# Patient Record
Sex: FEMALE | Race: OTHER | Employment: FULL TIME | URBAN - METROPOLITAN AREA
[De-identification: names, ages, dates, MRNs, and addresses within clinical notes are randomized per-mention and may not be internally consistent; named-entity substitution may affect disease eponyms.]

---

## 2019-07-10 ENCOUNTER — HOSPITAL ENCOUNTER (EMERGENCY)
Age: 29
Discharge: HOME | End: 2019-07-10
Payer: COMMERCIAL

## 2019-07-10 VITALS
DIASTOLIC BLOOD PRESSURE: 76 MMHG | RESPIRATION RATE: 16 BRPM | OXYGEN SATURATION: 97 % | BODY MASS INDEX: 24.55 KG/M2 | WEIGHT: 130 LBS | HEART RATE: 79 BPM | TEMPERATURE: 97.9 F | HEIGHT: 61 IN | SYSTOLIC BLOOD PRESSURE: 108 MMHG

## 2019-07-10 DIAGNOSIS — N39.0 URINARY TRACT INFECTION WITH HEMATURIA, SITE UNSPECIFIED: Primary | ICD-10-CM

## 2019-07-10 DIAGNOSIS — R31.9 URINARY TRACT INFECTION WITH HEMATURIA, SITE UNSPECIFIED: Primary | ICD-10-CM

## 2019-07-10 LAB
BACTERIA: ABNORMAL /HPF
BILIRUBIN URINE: NEGATIVE
BLOOD, URINE: ABNORMAL
CLARITY: ABNORMAL
COLOR: YELLOW
EPITHELIAL CELLS, UA: ABNORMAL /HPF
GLUCOSE URINE: NEGATIVE MG/DL
HCG(URINE) PREGNANCY TEST: NEGATIVE
KETONES, URINE: NEGATIVE MG/DL
LEUKOCYTE ESTERASE, URINE: ABNORMAL
NITRITE, URINE: POSITIVE
PH UA: 7.5 (ref 5–9)
PROTEIN UA: ABNORMAL MG/DL
RBC UA: ABNORMAL /HPF (ref 0–2)
SPECIFIC GRAVITY UA: 1.01 (ref 1–1.03)
UROBILINOGEN, URINE: 0.2 E.U./DL
WBC UA: ABNORMAL /HPF (ref 0–5)

## 2019-07-10 PROCEDURE — 81025 URINE PREGNANCY TEST: CPT

## 2019-07-10 PROCEDURE — 99283 EMERGENCY DEPT VISIT LOW MDM: CPT

## 2019-07-10 PROCEDURE — 81001 URINALYSIS AUTO W/SCOPE: CPT

## 2019-07-10 PROCEDURE — 87186 SC STD MICRODIL/AGAR DIL: CPT

## 2019-07-10 PROCEDURE — 87088 URINE BACTERIA CULTURE: CPT

## 2019-07-10 RX ORDER — CEFDINIR 300 MG/1
300 CAPSULE ORAL 2 TIMES DAILY
Qty: 14 CAPSULE | Refills: 0 | Status: SHIPPED | OUTPATIENT
Start: 2019-07-10 | End: 2019-07-17

## 2019-07-10 RX ORDER — PHENAZOPYRIDINE HYDROCHLORIDE 200 MG/1
200 TABLET, FILM COATED ORAL 3 TIMES DAILY PRN
Qty: 6 TABLET | Refills: 0 | Status: SHIPPED | OUTPATIENT
Start: 2019-07-10 | End: 2019-07-12

## 2019-07-10 ASSESSMENT — PAIN DESCRIPTION - ONSET: ONSET: SUDDEN

## 2019-07-10 ASSESSMENT — PAIN SCALES - GENERAL: PAINLEVEL_OUTOF10: 7

## 2019-07-10 ASSESSMENT — PAIN DESCRIPTION - ORIENTATION: ORIENTATION: RIGHT;LOWER

## 2019-07-10 ASSESSMENT — PAIN DESCRIPTION - DESCRIPTORS: DESCRIPTORS: DISCOMFORT

## 2019-07-10 ASSESSMENT — PAIN DESCRIPTION - FREQUENCY: FREQUENCY: CONTINUOUS

## 2019-07-10 ASSESSMENT — PAIN DESCRIPTION - LOCATION: LOCATION: BACK

## 2019-07-10 ASSESSMENT — PAIN DESCRIPTION - PROGRESSION: CLINICAL_PROGRESSION: NOT CHANGED

## 2019-07-10 ASSESSMENT — PAIN DESCRIPTION - PAIN TYPE: TYPE: ACUTE PAIN

## 2019-07-10 NOTE — ED PROVIDER NOTES
[facility]     Department of Emergency Medicine   ED  Provider Note  Admit Date/RoomTime: 7/10/2019  6:28 PM  ED Room: 05/05  Chief Complaint:   Urinary Tract Infection (+odor. Pain in right lower back. +frequency. Gets frequent uti's. )    History of Present Illness   Source of history provided by:  patient. History/Exam Limitations: none. Rusty Williamson is a 34 y.o. old female who has a past medical history of:   Past Medical History:   Diagnosis Date    Urinary tract infection     presents to the emergency department by private vehicle, for dysuria, which occured several day(s) prior to arrival.  Since onset the symptoms have been intermittent and mild in severity. Symptoms are associated with frequency. She has a history of recurrent UTI. Patient denies any vaginal discharge or possible std exposure    ROS   Pertinent positives and negatives are stated within HPI, all other systems reviewed and are negative. History reviewed. No pertinent surgical history. Social History:  reports that she has never smoked. She does not have any smokeless tobacco history on file. Family History: family history is not on file. Allergies: Sulfa antibiotics    Physical Exam            ED Triage Vitals   BP Temp Temp Source Pulse Resp SpO2 Height Weight   07/10/19 1825 07/10/19 1825 07/10/19 1825 07/10/19 1825 07/10/19 1825 07/10/19 1825 07/10/19 1833 07/10/19 1833   108/76 97.9 °F (36.6 °C) Oral 79 16 97 % 5' 1\" (1.549 m) 130 lb (59 kg)      Oxygen Saturation Interpretation: Normal.    · Constitutional:  Alert, development consistent with age. · HEENT:  NC/NT. Airway patent. · Neck:  Normal ROM. Supple. · Respiratory:  Lungs Clear to auscultation and breath sounds equal.  · CV:  Regular rate and rhythm, normal heart sounds, without pathological murmurs, ectopy, gallops, or rubs. · GI:  General Appearance: normal.         Bowel sounds: normal bowel sounds. Distension:  None. spoken with the patient and discussed todays results, in addition to providing specific details for the plan of care and counseling regarding the diagnosis and prognosis. Questions are answered at this time and they are agreeable with the plan. Assessment      1. Urinary tract infection with hematuria, site unspecified      Plan   Disposition: Discharge to home  Patient condition is good    New Medications     Discharge Medication List as of 7/10/2019  7:20 PM      START taking these medications    Details   phenazopyridine (PYRIDIUM) 200 MG tablet Take 1 tablet by mouth 3 times daily as needed for Pain, Disp-6 tablet, R-0Print      cefdinir (OMNICEF) 300 MG capsule Take 1 capsule by mouth 2 times daily for 7 days, Disp-14 capsule, R-0Print           Electronically signed by Dorthea Romberg, APRN - CNP   DD: 7/10/19  **This report was transcribed using voice recognition software. Every effort was made to ensure accuracy; however, inadvertent computerized transcription errors may be present.   END OF ED PROVIDER NOTE          Dorthea Romberg, APRN - CNP  07/10/19 1286  ATTENDING PROVIDER ATTESTATION:     Supervising Physician, on-site, available for consultation, non-participatory in the evaluation or care of this patient       Kehinde Austin DO  07/11/19 4985

## 2019-07-13 LAB
ORGANISM: ABNORMAL
URINE CULTURE, ROUTINE: ABNORMAL
URINE CULTURE, ROUTINE: ABNORMAL

## 2019-08-07 ENCOUNTER — HOSPITAL ENCOUNTER (EMERGENCY)
Age: 29
Discharge: HOME | End: 2019-08-07
Attending: EMERGENCY MEDICINE
Payer: COMMERCIAL

## 2019-08-07 VITALS
HEART RATE: 80 BPM | SYSTOLIC BLOOD PRESSURE: 120 MMHG | RESPIRATION RATE: 16 BRPM | HEIGHT: 61 IN | BODY MASS INDEX: 24.55 KG/M2 | OXYGEN SATURATION: 98 % | TEMPERATURE: 97.6 F | WEIGHT: 130 LBS | DIASTOLIC BLOOD PRESSURE: 70 MMHG

## 2019-08-07 DIAGNOSIS — N39.0 URINARY TRACT INFECTION WITHOUT HEMATURIA, SITE UNSPECIFIED: Primary | ICD-10-CM

## 2019-08-07 LAB
BACTERIA: ABNORMAL /HPF
BILIRUBIN URINE: NEGATIVE
BLOOD, URINE: ABNORMAL
CLARITY: ABNORMAL
COLOR: YELLOW
EPITHELIAL CELLS, UA: ABNORMAL /HPF
GLUCOSE URINE: NEGATIVE MG/DL
HCG(URINE) PREGNANCY TEST: NEGATIVE
KETONES, URINE: NEGATIVE MG/DL
LEUKOCYTE ESTERASE, URINE: ABNORMAL
NITRITE, URINE: NEGATIVE
PH UA: 6 (ref 5–9)
PROTEIN UA: 100 MG/DL
RBC UA: ABNORMAL /HPF (ref 0–2)
SPECIFIC GRAVITY UA: 1.02 (ref 1–1.03)
UROBILINOGEN, URINE: 0.2 E.U./DL
WBC UA: >20 /HPF (ref 0–5)

## 2019-08-07 PROCEDURE — 81001 URINALYSIS AUTO W/SCOPE: CPT

## 2019-08-07 PROCEDURE — 87088 URINE BACTERIA CULTURE: CPT

## 2019-08-07 PROCEDURE — 99283 EMERGENCY DEPT VISIT LOW MDM: CPT

## 2019-08-07 PROCEDURE — 87186 SC STD MICRODIL/AGAR DIL: CPT

## 2019-08-07 PROCEDURE — 81025 URINE PREGNANCY TEST: CPT

## 2019-08-07 PROCEDURE — 6370000000 HC RX 637 (ALT 250 FOR IP): Performed by: EMERGENCY MEDICINE

## 2019-08-07 RX ORDER — IBUPROFEN 800 MG/1
800 TABLET ORAL ONCE
Status: COMPLETED | OUTPATIENT
Start: 2019-08-07 | End: 2019-08-07

## 2019-08-07 RX ORDER — CEFDINIR 300 MG/1
300 CAPSULE ORAL ONCE
Status: COMPLETED | OUTPATIENT
Start: 2019-08-07 | End: 2019-08-07

## 2019-08-07 RX ORDER — CEFDINIR 300 MG/1
300 CAPSULE ORAL 2 TIMES DAILY
Qty: 20 CAPSULE | Refills: 0 | Status: SHIPPED | OUTPATIENT
Start: 2019-08-07 | End: 2019-08-17

## 2019-08-07 RX ORDER — TRAMADOL HYDROCHLORIDE 50 MG/1
50 TABLET ORAL EVERY 6 HOURS PRN
Qty: 5 TABLET | Refills: 0 | Status: SHIPPED | OUTPATIENT
Start: 2019-08-07 | End: 2019-08-09

## 2019-08-07 RX ADMIN — IBUPROFEN 800 MG: 800 TABLET, FILM COATED ORAL at 06:25

## 2019-08-07 RX ADMIN — CEFDINIR 300 MG: 300 CAPSULE ORAL at 06:25

## 2019-08-07 ASSESSMENT — PAIN SCALES - GENERAL
PAINLEVEL_OUTOF10: 6
PAINLEVEL_OUTOF10: 6

## 2019-08-07 ASSESSMENT — PAIN DESCRIPTION - DESCRIPTORS: DESCRIPTORS: DISCOMFORT

## 2019-08-07 ASSESSMENT — PAIN DESCRIPTION - LOCATION: LOCATION: PELVIS

## 2019-08-07 ASSESSMENT — PAIN DESCRIPTION - PAIN TYPE: TYPE: ACUTE PAIN

## 2019-08-07 NOTE — ED PROVIDER NOTES
HPI:  8/7/19,   Time: 6:25 AM         Nikki Reyez is a 34 y.o. female presenting to the ED for dysuria, beginning more than 1 hour ago. The complaint has been persistent, moderate in severity, and worsened by nothing. States had recent UTI approximately 2 weeks ago finished her antibiotics complaining of urinary burning today no fever chills no vomiting    ROS:   Pertinent positives and negatives are stated within HPI, all other systems reviewed and are negative.  --------------------------------------------- PAST HISTORY ---------------------------------------------  Past Medical History:  has a past medical history of Urinary tract infection. Past Surgical History:  has no past surgical history on file. Social History:  reports that she has never smoked. She has never used smokeless tobacco. She reports that she drinks alcohol. She reports that she does not use drugs. Family History: family history is not on file. The patients home medications have been reviewed.     Allergies: Sulfa antibiotics    -------------------------------------------------- RESULTS -------------------------------------------------  All laboratory and radiology results have been personally reviewed by myself   LABS:  Results for orders placed or performed during the hospital encounter of 08/07/19   Urinalysis with Microscopic   Result Value Ref Range    Color, UA Yellow Straw/Yellow    Clarity, UA CLOUDY (A) Clear    Glucose, Ur Negative Negative mg/dL    Bilirubin Urine Negative Negative    Ketones, Urine Negative Negative mg/dL    Specific Gravity, UA 1.025 1.005 - 1.030    Blood, Urine MODERATE (A) Negative    pH, UA 6.0 5.0 - 9.0    Protein,  (A) Negative mg/dL    Urobilinogen, Urine 0.2 <2.0 E.U./dL    Nitrite, Urine Negative Negative    Leukocyte Esterase, Urine MODERATE (A) Negative    WBC, UA >20 0 - 5 /HPF    RBC, UA 5-10 (A) 0 - 2 /HPF    Epi Cells FEW /HPF    Bacteria, UA MANY (A) /HPF   Pregnancy,

## 2019-08-09 LAB
ORGANISM: ABNORMAL
URINE CULTURE, ROUTINE: ABNORMAL
URINE CULTURE, ROUTINE: ABNORMAL

## 2019-11-21 ENCOUNTER — HOSPITAL ENCOUNTER (EMERGENCY)
Age: 29
Discharge: HOME | End: 2019-11-21
Attending: EMERGENCY MEDICINE
Payer: COMMERCIAL

## 2019-11-21 VITALS
HEIGHT: 61 IN | WEIGHT: 138 LBS | OXYGEN SATURATION: 98 % | TEMPERATURE: 97.8 F | BODY MASS INDEX: 26.06 KG/M2 | RESPIRATION RATE: 16 BRPM | HEART RATE: 74 BPM | SYSTOLIC BLOOD PRESSURE: 126 MMHG | DIASTOLIC BLOOD PRESSURE: 82 MMHG

## 2019-11-21 DIAGNOSIS — N30.01 ACUTE CYSTITIS WITH HEMATURIA: Primary | ICD-10-CM

## 2019-11-21 LAB
BACTERIA: ABNORMAL /HPF
BILIRUBIN URINE: NEGATIVE
BLOOD, URINE: ABNORMAL
CLARITY: ABNORMAL
COLOR: YELLOW
GLUCOSE URINE: NEGATIVE MG/DL
HCG(URINE) PREGNANCY TEST: NEGATIVE
KETONES, URINE: NEGATIVE MG/DL
LEUKOCYTE ESTERASE, URINE: ABNORMAL
NITRITE, URINE: NEGATIVE
PH UA: 5.5 (ref 5–9)
PROTEIN UA: 30 MG/DL
RBC UA: ABNORMAL /HPF (ref 0–2)
SPECIFIC GRAVITY UA: 1.02 (ref 1–1.03)
UROBILINOGEN, URINE: 0.2 E.U./DL
WBC UA: ABNORMAL /HPF (ref 0–5)

## 2019-11-21 PROCEDURE — 87088 URINE BACTERIA CULTURE: CPT

## 2019-11-21 PROCEDURE — 6370000000 HC RX 637 (ALT 250 FOR IP)

## 2019-11-21 PROCEDURE — 81001 URINALYSIS AUTO W/SCOPE: CPT

## 2019-11-21 PROCEDURE — 6370000000 HC RX 637 (ALT 250 FOR IP): Performed by: EMERGENCY MEDICINE

## 2019-11-21 PROCEDURE — 87186 SC STD MICRODIL/AGAR DIL: CPT

## 2019-11-21 PROCEDURE — 99283 EMERGENCY DEPT VISIT LOW MDM: CPT

## 2019-11-21 PROCEDURE — 81025 URINE PREGNANCY TEST: CPT

## 2019-11-21 RX ORDER — CEFDINIR 300 MG/1
300 CAPSULE ORAL 2 TIMES DAILY
Qty: 20 CAPSULE | Refills: 0 | Status: SHIPPED | OUTPATIENT
Start: 2019-11-21 | End: 2019-12-01

## 2019-11-21 RX ORDER — CEFDINIR 300 MG/1
CAPSULE ORAL
Status: COMPLETED
Start: 2019-11-21 | End: 2019-11-21

## 2019-11-21 RX ORDER — PHENAZOPYRIDINE HYDROCHLORIDE 100 MG/1
200 TABLET, FILM COATED ORAL 3 TIMES DAILY PRN
Qty: 9 TABLET | Refills: 0 | Status: SHIPPED | OUTPATIENT
Start: 2019-11-21 | End: 2019-11-24

## 2019-11-21 RX ORDER — PHENAZOPYRIDINE HYDROCHLORIDE 100 MG/1
200 TABLET, FILM COATED ORAL ONCE
Status: COMPLETED | OUTPATIENT
Start: 2019-11-21 | End: 2019-11-21

## 2019-11-21 RX ORDER — CEFDINIR 300 MG/1
300 CAPSULE ORAL EVERY 12 HOURS SCHEDULED
Status: DISCONTINUED | OUTPATIENT
Start: 2019-11-21 | End: 2019-11-21 | Stop reason: HOSPADM

## 2019-11-21 RX ADMIN — CEFDINIR 300 MG: 300 CAPSULE ORAL at 05:54

## 2019-11-21 RX ADMIN — PHENAZOPYRIDINE 200 MG: 100 TABLET ORAL at 05:54

## 2019-11-21 ASSESSMENT — PAIN - FUNCTIONAL ASSESSMENT: PAIN_FUNCTIONAL_ASSESSMENT: PREVENTS OR INTERFERES SOME ACTIVE ACTIVITIES AND ADLS

## 2019-11-21 ASSESSMENT — PAIN DESCRIPTION - PROGRESSION: CLINICAL_PROGRESSION: NOT CHANGED

## 2019-11-21 ASSESSMENT — PAIN DESCRIPTION - DESCRIPTORS: DESCRIPTORS: CONSTANT;DISCOMFORT

## 2019-11-21 ASSESSMENT — PAIN SCALES - GENERAL: PAINLEVEL_OUTOF10: 7

## 2019-11-21 ASSESSMENT — PAIN DESCRIPTION - LOCATION: LOCATION: VAGINA

## 2019-11-21 ASSESSMENT — PAIN DESCRIPTION - ONSET: ONSET: ON-GOING

## 2019-11-21 ASSESSMENT — PAIN DESCRIPTION - FREQUENCY: FREQUENCY: CONTINUOUS

## 2019-11-21 ASSESSMENT — PAIN DESCRIPTION - PAIN TYPE: TYPE: ACUTE PAIN

## 2019-11-23 LAB
ORGANISM: ABNORMAL
URINE CULTURE, ROUTINE: ABNORMAL

## 2020-06-05 ENCOUNTER — HOSPITAL ENCOUNTER (EMERGENCY)
Age: 30
Discharge: HOME | End: 2020-06-05
Payer: COMMERCIAL

## 2020-06-05 VITALS
SYSTOLIC BLOOD PRESSURE: 118 MMHG | DIASTOLIC BLOOD PRESSURE: 84 MMHG | TEMPERATURE: 97.4 F | OXYGEN SATURATION: 98 % | RESPIRATION RATE: 14 BRPM | BODY MASS INDEX: 26.43 KG/M2 | HEIGHT: 61 IN | WEIGHT: 140 LBS | HEART RATE: 80 BPM

## 2020-06-05 LAB — STREP GRP A PCR: NEGATIVE

## 2020-06-05 PROCEDURE — 99283 EMERGENCY DEPT VISIT LOW MDM: CPT

## 2020-06-05 PROCEDURE — 87880 STREP A ASSAY W/OPTIC: CPT

## 2020-06-05 RX ORDER — LORATADINE AND PSEUDOEPHEDRINE SULFATE 10; 240 MG/1; MG/1
1 TABLET, EXTENDED RELEASE ORAL DAILY PRN
Qty: 7 TABLET | Refills: 0 | Status: SHIPPED | OUTPATIENT
Start: 2020-06-05 | End: 2020-06-12

## 2020-06-05 ASSESSMENT — PAIN DESCRIPTION - ORIENTATION: ORIENTATION: RIGHT

## 2020-06-05 ASSESSMENT — PAIN DESCRIPTION - DESCRIPTORS: DESCRIPTORS: SHARP

## 2020-06-05 ASSESSMENT — PAIN DESCRIPTION - PAIN TYPE: TYPE: ACUTE PAIN

## 2020-06-05 ASSESSMENT — PAIN SCALES - GENERAL: PAINLEVEL_OUTOF10: 8

## 2020-06-05 ASSESSMENT — PAIN DESCRIPTION - LOCATION: LOCATION: EAR;THROAT

## 2020-06-06 ENCOUNTER — CARE COORDINATION (OUTPATIENT)
Dept: CARE COORDINATION | Age: 30
End: 2020-06-06

## 2020-06-06 NOTE — ED PROVIDER NOTES
History:  has no past surgical history on file. Social History:  reports that she has never smoked. She has never used smokeless tobacco. She reports current alcohol use. She reports that she does not use drugs. Family History: family history is not on file. Allergies: Sulfa antibiotics    Physical Exam           ED Triage Vitals   BP Temp Temp Source Pulse Resp SpO2 Height Weight   06/05/20 1817 06/05/20 1817 06/05/20 1817 06/05/20 1817 06/05/20 1817 06/05/20 1817 06/05/20 1823 06/05/20 1823   118/84 97.4 °F (36.3 °C) Temporal 80 14 98 % 5' 1\" (1.549 m) 140 lb (63.5 kg)     Oxygen Saturation Interpretation: Normal.    Constitutional:  Alert, development consistent with age. .  Ears:  TMs without perforation, injection, or bulging. External canals clear without exudate. Throat: Airway Patent. mild erythema. Neck/Lymphatic:  Supple. There is no  anterior cervical and posterior cervical node tenderness. respiratory:  Clear to auscultation and breath sounds equal.    CV: Regular rate and rhythm, normal heart sounds, without pathological murmurs, ectopy, gallops, or rubs. GI:  Abdomen Soft, nontender, good bowel sounds. No firm or pulsatile mass. Inegument:  No rashes, erythema present. Neurological:  Oriented. Motor functions intact. Lab / Imaging Results   (All laboratory and radiology results have been personally reviewed by myself)  Labs:  Results for orders placed or performed during the hospital encounter of 06/05/20   Strep Screen Group A Throat   Result Value Ref Range    Strep Grp A PCR Negative Negative     Imaging: All Radiology results interpreted by Radiologist unless otherwise noted.   No orders to display       ED Course / Medical Decision Making   Medications - No data to display     Consult(s):   None    Procedure(s):   none    MDM:   Based on moderate suspicion for Strep as per history/physical findings, Rapid Strep/Throat Culture testing was done and confirms the above findings

## 2020-06-07 NOTE — CARE COORDINATION
Patient contacted regarding Claudell Shoe. Discussed COVID-19 related testing which was not done at this time. Test results were not done. Patient informed of results, if available? n/a    Care Transition Nurse/ Ambulatory Care Manager contacted the patient by telephone to perform post discharge assessment. Verified name and  with patient as identifiers. Provided introduction to self, and explanation of the CTN/ACM role, and reason for call due to risk factors for infection and/or exposure to COVID-19. Symptoms reviewed with patient who verbalized the following symptoms: no new symptoms, no worsening symptoms and sore throat. Called pt this morning for an ED f/u call from ED visit on 20 for sore throat and R ear pain. Pt states that sore throat has improved. No ear pain. States that the pain in her throat is only there when she eats something. Denies any other symptoms. Pt states that she will call her provider on Monday to try and set up a f/u appointment. Pt agreeable to Loop enrollment. Due to no new or worsening symptoms encounter was not routed to provider for escalation. Discussed follow-up appointments. If no appointment was previously scheduled, appointment scheduling offered: [facility] follow up appointment(s): No future appointments. Non-[facility] follow up appointment(s): Sees Flores Carreon CNP. Pt states that she will call on Monday. Due to her work schedule she states that she has to see what her provider has open to be able to work around her schedule. Patient has following risk factors of: no known risk factors. CTN/ACM reviewed discharge instructions, medical action plan and red flags such as increased shortness of breath, increasing fever and signs of decompensation with patient who verbalized understanding. Discussed exposure protocols and quarantine with CDC Guidelines What to do if you are sick with coronavirus disease 2019.  Patient was given an opportunity for questions

## 2020-06-24 ENCOUNTER — APPOINTMENT (OUTPATIENT)
Dept: CT IMAGING | Age: 30
End: 2020-06-24
Payer: COMMERCIAL

## 2020-06-24 ENCOUNTER — HOSPITAL ENCOUNTER (EMERGENCY)
Age: 30
Discharge: HOME | End: 2020-06-24
Attending: EMERGENCY MEDICINE
Payer: COMMERCIAL

## 2020-06-24 VITALS
SYSTOLIC BLOOD PRESSURE: 115 MMHG | TEMPERATURE: 98.3 F | HEIGHT: 61 IN | OXYGEN SATURATION: 99 % | WEIGHT: 138 LBS | BODY MASS INDEX: 26.06 KG/M2 | DIASTOLIC BLOOD PRESSURE: 79 MMHG | HEART RATE: 79 BPM | RESPIRATION RATE: 18 BRPM

## 2020-06-24 LAB
ALBUMIN SERPL-MCNC: 4.6 G/DL (ref 3.5–5.2)
ALP BLD-CCNC: 65 U/L (ref 35–104)
ALT SERPL-CCNC: 23 U/L (ref 0–32)
ANION GAP SERPL CALCULATED.3IONS-SCNC: 12 MMOL/L (ref 7–16)
AST SERPL-CCNC: 22 U/L (ref 0–31)
BACTERIA: ABNORMAL /HPF
BASOPHILS ABSOLUTE: 0.02 E9/L (ref 0–0.2)
BASOPHILS RELATIVE PERCENT: 0.2 % (ref 0–2)
BILIRUB SERPL-MCNC: 0.4 MG/DL (ref 0–1.2)
BILIRUBIN URINE: NEGATIVE
BLOOD, URINE: ABNORMAL
BUN BLDV-MCNC: 12 MG/DL (ref 6–20)
CALCIUM SERPL-MCNC: 9.1 MG/DL (ref 8.6–10.2)
CHLORIDE BLD-SCNC: 100 MMOL/L (ref 98–107)
CLARITY: CLEAR
CO2: 25 MMOL/L (ref 22–29)
COLOR: YELLOW
CREAT SERPL-MCNC: 0.7 MG/DL (ref 0.5–1)
EOSINOPHILS ABSOLUTE: 0.05 E9/L (ref 0.05–0.5)
EOSINOPHILS RELATIVE PERCENT: 0.6 % (ref 0–6)
GFR AFRICAN AMERICAN: >60
GFR NON-AFRICAN AMERICAN: >60 ML/MIN/1.73
GLUCOSE BLD-MCNC: 87 MG/DL (ref 74–99)
GLUCOSE URINE: NEGATIVE MG/DL
HCG(URINE) PREGNANCY TEST: NEGATIVE
HCT VFR BLD CALC: 41.3 % (ref 34–48)
HEMOGLOBIN: 13.7 G/DL (ref 11.5–15.5)
IMMATURE GRANULOCYTES #: 0.03 E9/L
IMMATURE GRANULOCYTES %: 0.3 % (ref 0–5)
KETONES, URINE: NEGATIVE MG/DL
LACTIC ACID: 0.7 MMOL/L (ref 0.5–2.2)
LEUKOCYTE ESTERASE, URINE: ABNORMAL
LYMPHOCYTES ABSOLUTE: 1.46 E9/L (ref 1.5–4)
LYMPHOCYTES RELATIVE PERCENT: 16.2 % (ref 20–42)
MAGNESIUM: 2.1 MG/DL (ref 1.6–2.6)
MCH RBC QN AUTO: 30 PG (ref 26–35)
MCHC RBC AUTO-ENTMCNC: 33.2 % (ref 32–34.5)
MCV RBC AUTO: 90.6 FL (ref 80–99.9)
MONOCYTES ABSOLUTE: 0.55 E9/L (ref 0.1–0.95)
MONOCYTES RELATIVE PERCENT: 6.1 % (ref 2–12)
NEUTROPHILS ABSOLUTE: 6.89 E9/L (ref 1.8–7.3)
NEUTROPHILS RELATIVE PERCENT: 76.6 % (ref 43–80)
NITRITE, URINE: NEGATIVE
PDW BLD-RTO: 13.1 FL (ref 11.5–15)
PH UA: 6 (ref 5–9)
PLATELET # BLD: 289 E9/L (ref 130–450)
PMV BLD AUTO: 9.1 FL (ref 7–12)
POTASSIUM REFLEX MAGNESIUM: 3.5 MMOL/L (ref 3.5–5)
PROTEIN UA: ABNORMAL MG/DL
RBC # BLD: 4.56 E12/L (ref 3.5–5.5)
RBC UA: ABNORMAL /HPF (ref 0–2)
SODIUM BLD-SCNC: 137 MMOL/L (ref 132–146)
SPECIFIC GRAVITY UA: 1.01 (ref 1–1.03)
TOTAL PROTEIN: 8.1 G/DL (ref 6.4–8.3)
UROBILINOGEN, URINE: 0.2 E.U./DL
WBC # BLD: 9 E9/L (ref 4.5–11.5)
WBC UA: ABNORMAL /HPF (ref 0–5)

## 2020-06-24 PROCEDURE — 6360000002 HC RX W HCPCS: Performed by: EMERGENCY MEDICINE

## 2020-06-24 PROCEDURE — 36415 COLL VENOUS BLD VENIPUNCTURE: CPT

## 2020-06-24 PROCEDURE — 83605 ASSAY OF LACTIC ACID: CPT

## 2020-06-24 PROCEDURE — 99284 EMERGENCY DEPT VISIT MOD MDM: CPT

## 2020-06-24 PROCEDURE — 74177 CT ABD & PELVIS W/CONTRAST: CPT

## 2020-06-24 PROCEDURE — 2580000003 HC RX 258: Performed by: RADIOLOGY

## 2020-06-24 PROCEDURE — 87040 BLOOD CULTURE FOR BACTERIA: CPT

## 2020-06-24 PROCEDURE — 85025 COMPLETE CBC W/AUTO DIFF WBC: CPT

## 2020-06-24 PROCEDURE — 6360000004 HC RX CONTRAST MEDICATION: Performed by: RADIOLOGY

## 2020-06-24 PROCEDURE — 87088 URINE BACTERIA CULTURE: CPT

## 2020-06-24 PROCEDURE — 81001 URINALYSIS AUTO W/SCOPE: CPT

## 2020-06-24 PROCEDURE — 83735 ASSAY OF MAGNESIUM: CPT

## 2020-06-24 PROCEDURE — 81025 URINE PREGNANCY TEST: CPT

## 2020-06-24 PROCEDURE — 96375 TX/PRO/DX INJ NEW DRUG ADDON: CPT

## 2020-06-24 PROCEDURE — 2580000003 HC RX 258: Performed by: EMERGENCY MEDICINE

## 2020-06-24 PROCEDURE — 80053 COMPREHEN METABOLIC PANEL: CPT

## 2020-06-24 PROCEDURE — 96374 THER/PROPH/DIAG INJ IV PUSH: CPT

## 2020-06-24 RX ORDER — 0.9 % SODIUM CHLORIDE 0.9 %
1000 INTRAVENOUS SOLUTION INTRAVENOUS ONCE
Status: COMPLETED | OUTPATIENT
Start: 2020-06-24 | End: 2020-06-24

## 2020-06-24 RX ORDER — CEFDINIR 300 MG/1
300 CAPSULE ORAL 2 TIMES DAILY
Qty: 20 CAPSULE | Refills: 0 | Status: SHIPPED | OUTPATIENT
Start: 2020-06-24 | End: 2020-07-04

## 2020-06-24 RX ORDER — ONDANSETRON 4 MG/1
4 TABLET, ORALLY DISINTEGRATING ORAL EVERY 8 HOURS PRN
Qty: 10 TABLET | Refills: 0 | Status: SHIPPED | OUTPATIENT
Start: 2020-06-24 | End: 2020-08-01

## 2020-06-24 RX ORDER — SODIUM CHLORIDE 0.9 % (FLUSH) 0.9 %
10 SYRINGE (ML) INJECTION PRN
Status: DISCONTINUED | OUTPATIENT
Start: 2020-06-24 | End: 2020-06-24 | Stop reason: HOSPADM

## 2020-06-24 RX ORDER — LORATADINE 10 MG/1
10 TABLET ORAL DAILY
COMMUNITY
End: 2020-07-03 | Stop reason: SDUPTHER

## 2020-06-24 RX ORDER — KETOROLAC TROMETHAMINE 30 MG/ML
30 INJECTION, SOLUTION INTRAMUSCULAR; INTRAVENOUS ONCE
Status: COMPLETED | OUTPATIENT
Start: 2020-06-24 | End: 2020-06-24

## 2020-06-24 RX ADMIN — Medication 10 ML: at 15:57

## 2020-06-24 RX ADMIN — KETOROLAC TROMETHAMINE 30 MG: 30 INJECTION, SOLUTION INTRAMUSCULAR at 14:39

## 2020-06-24 RX ADMIN — IOPAMIDOL 100 ML: 755 INJECTION, SOLUTION INTRAVENOUS at 15:57

## 2020-06-24 RX ADMIN — SODIUM CHLORIDE 1000 ML: 9 INJECTION, SOLUTION INTRAVENOUS at 14:40

## 2020-06-24 RX ADMIN — CEFTRIAXONE SODIUM 1 G: 1 INJECTION, POWDER, FOR SOLUTION INTRAMUSCULAR; INTRAVENOUS at 14:39

## 2020-06-24 ASSESSMENT — ENCOUNTER SYMPTOMS
VOMITING: 0
EYE REDNESS: 0
NAUSEA: 0
ABDOMINAL PAIN: 0
SHORTNESS OF BREATH: 0

## 2020-06-24 ASSESSMENT — PAIN SCALES - GENERAL
PAINLEVEL_OUTOF10: 6

## 2020-06-24 ASSESSMENT — PAIN DESCRIPTION - LOCATION: LOCATION: FLANK

## 2020-06-24 ASSESSMENT — PAIN DESCRIPTION - ORIENTATION: ORIENTATION: RIGHT

## 2020-06-24 ASSESSMENT — PAIN DESCRIPTION - PAIN TYPE: TYPE: ACUTE PAIN

## 2020-06-24 ASSESSMENT — PAIN DESCRIPTION - DESCRIPTORS: DESCRIPTORS: DISCOMFORT

## 2020-06-24 NOTE — ED NOTES
Patient given discharge paperwork at this time. Patient also given scripts. Patient has no further questions at this time. Nurse provided education to patient. Patient is alert and oriented and VS are stable at this time.       Hesham Yoo RN  06/24/20 4719

## 2020-06-25 ENCOUNTER — CARE COORDINATION (OUTPATIENT)
Dept: CARE COORDINATION | Age: 30
End: 2020-06-25

## 2020-06-25 LAB — URINE CULTURE, ROUTINE: NORMAL

## 2020-06-30 LAB
BLOOD CULTURE, ROUTINE: NORMAL
CULTURE, BLOOD 2: NORMAL

## 2020-07-03 ENCOUNTER — HOSPITAL ENCOUNTER (EMERGENCY)
Age: 30
Discharge: HOME | End: 2020-07-03
Attending: EMERGENCY MEDICINE
Payer: COMMERCIAL

## 2020-07-03 VITALS
HEART RATE: 87 BPM | HEIGHT: 61 IN | SYSTOLIC BLOOD PRESSURE: 116 MMHG | WEIGHT: 136 LBS | OXYGEN SATURATION: 98 % | DIASTOLIC BLOOD PRESSURE: 78 MMHG | TEMPERATURE: 98.3 F | BODY MASS INDEX: 25.68 KG/M2 | RESPIRATION RATE: 18 BRPM

## 2020-07-03 LAB — STREP GRP A PCR: NEGATIVE

## 2020-07-03 PROCEDURE — 87880 STREP A ASSAY W/OPTIC: CPT

## 2020-07-03 PROCEDURE — 99282 EMERGENCY DEPT VISIT SF MDM: CPT

## 2020-07-03 RX ORDER — LORATADINE 10 MG/1
10 TABLET ORAL DAILY
Qty: 30 TABLET | Refills: 0 | Status: SHIPPED | OUTPATIENT
Start: 2020-07-03 | End: 2020-07-03 | Stop reason: SDUPTHER

## 2020-07-03 RX ORDER — AZITHROMYCIN 250 MG/1
TABLET, FILM COATED ORAL
Qty: 1 PACKET | Refills: 0 | Status: SHIPPED | OUTPATIENT
Start: 2020-07-03 | End: 2020-07-03

## 2020-07-03 RX ORDER — LORATADINE 10 MG/1
10 TABLET ORAL DAILY
Qty: 30 TABLET | Refills: 0 | Status: SHIPPED | OUTPATIENT
Start: 2020-07-03 | End: 2020-08-01 | Stop reason: ALTCHOICE

## 2020-07-03 ASSESSMENT — PAIN DESCRIPTION - ONSET: ONSET: ON-GOING

## 2020-07-03 ASSESSMENT — PAIN DESCRIPTION - FREQUENCY
FREQUENCY: CONTINUOUS
FREQUENCY: CONTINUOUS

## 2020-07-03 ASSESSMENT — PAIN DESCRIPTION - ORIENTATION
ORIENTATION: RIGHT;LEFT
ORIENTATION: RIGHT;LEFT

## 2020-07-03 ASSESSMENT — PAIN DESCRIPTION - LOCATION
LOCATION: EAR
LOCATION: EAR

## 2020-07-03 ASSESSMENT — PAIN SCALES - GENERAL
PAINLEVEL_OUTOF10: 5
PAINLEVEL_OUTOF10: 5

## 2020-07-03 ASSESSMENT — PAIN DESCRIPTION - PAIN TYPE
TYPE: ACUTE PAIN
TYPE: ACUTE PAIN

## 2020-07-03 ASSESSMENT — PAIN DESCRIPTION - DESCRIPTORS
DESCRIPTORS: ACHING
DESCRIPTORS: ACHING

## 2020-07-03 ASSESSMENT — PAIN - FUNCTIONAL ASSESSMENT: PAIN_FUNCTIONAL_ASSESSMENT: 0-10

## 2020-07-03 ASSESSMENT — PAIN DESCRIPTION - PROGRESSION: CLINICAL_PROGRESSION: NOT CHANGED

## 2020-07-04 NOTE — ED PROVIDER NOTES
HPI:  7/3/20, Time: 8:11 PM EDT         Vera Magallon is a 31 y.o. female presenting to the ED for allergies, sore throat and bilateral ear fullness, beginning 1 days ago. The complaint has been persistent, moderate in severity, and worsened by nothing. Also requesting refill of the allergy medication she was given here about 3 weeks ago, states it helped but now has the above symptoms. Currently on Omnicef for \"kidney infection\". Patient denies fever/chills, cough, chest pain, shortness of breath, edema, headache, visual disturbances, focal paresthesias, focal weakness, abdominal pain, nausea, vomiting, diarrhea, constipation, dysuria, hematuria, trauma, rash, neck or back pain or other complaints. ROS:   Pertinent positives and negatives are stated within HPI, all other systems reviewed and are negative.      --------------------------------------------- PAST HISTORY ---------------------------------------------  Past Medical History:  has a past medical history of Urinary tract infection. Past Surgical History:  has no past surgical history on file. Social History:  reports that she has never smoked. She has never used smokeless tobacco. She reports current alcohol use. She reports that she does not use drugs. Family History: family history is not on file. The patients home medications have been reviewed. Allergies: Sulfa antibiotics        ---------------------------------------------------PHYSICAL EXAM--------------------------------------    Constitutional:  Well developed, well nourished, no acute distress, non-toxic appearance   Eyes:  PERRL, conjunctiva normal, EOMI  HENT:  Atraumatic, external ears normal, nose normal, oropharynx moist. Neck- normal range of motion, no tenderness, supple, no nuchal rigidity. TMs clear and intact bilaterally. Airway patent without pharyngeal exudate or swelling but has bilaterally red. Uvula is midline. Dentition intact.   Sinus congestion noted. Respiratory:  No respiratory distress, normal breath sounds, no rales, no wheezing   Cardiovascular:  Normal rate, normal rhythm, no murmurs, no gallops, no rubs. Radial pulses 2+ bilaterally. GI:  Soft, nondistended, normal bowel sounds, nontender,no rebound, no guarding   Musculoskeletal:  No edema, no deformities. Integument:  Well hydrated, no rash. Adequate perfusion. Lymphatic:  No cervical lymphadenopathy noted   Neurologic:  Alert & oriented x 3, CN 2-12 normal, normal gait, no focal deficits noted. Psychiatric:  Speech and behavior appropriate       -------------------------------------------------- RESULTS -------------------------------------------------  I have personally reviewed all laboratory and imaging results for this patient. Results are listed below. LABS:  Results for orders placed or performed during the hospital encounter of 07/03/20   Strep Screen Group A Throat   Result Value Ref Range    Strep Grp A PCR Negative Negative       RADIOLOGY:  Interpreted by Radiologist.  No orders to display       ------------------------- NURSING NOTES AND VITALS REVIEWED ---------------------------   The nursing notes within the ED encounter and vital signs as below have been reviewed by myself. /78   Pulse 87   Temp 98.3 °F (36.8 °C) (Temporal)   Resp 18   Ht 5' 1\" (1.549 m)   Wt 136 lb (61.7 kg)   LMP 06/11/2020   SpO2 98%   BMI 25.70 kg/m²   Oxygen Saturation Interpretation: Normal    The patients available past medical records and past encounters were reviewed. ------------------------------ ED COURSE/MEDICAL DECISION MAKING----------------------  Medications - No data to display        Procedures:  none      Medical Decision Making:    Rx - loratidine   Patient was explicitly instructed on specific signs and symptoms on which to return to the emergency room for. Patient was instructed to return to the ER for any new or worsening symptoms.  Additional discharge instructions were given verbally. All questions were answered. Patient is comfortable and agreeable with discharge plan. Patient in no acute distress and non-toxic in appearance. This patient's ED course included: a personal history and physicial eaxmination    This patient has remained hemodynamically stable during their ED course. Consultations:             none    Critical Care: none        Counseling: The emergency provider has spoken with the patient and discussed todays results, in addition to providing specific details for the plan of care and counseling regarding the diagnosis and prognosis. Questions are answered at this time and they are agreeable with the plan.       --------------------------------- IMPRESSION AND DISPOSITION ---------------------------------    IMPRESSION  1.  Seasonal allergies        DISPOSITION  Disposition: Discharge to home  Patient condition is stable                 Jovi Dang DO  07/03/20 2015       Jovi Dang DO  07/03/20 2019

## 2020-07-06 ENCOUNTER — CARE COORDINATION (OUTPATIENT)
Dept: CARE COORDINATION | Age: 30
End: 2020-07-06

## 2020-07-06 NOTE — CARE COORDINATION
Patient contacted regarding recent discharge and COVID-19 risk. Discussed COVID-19 related testing which was not done at this time. Test results were not done. Patient informed of results, if available? No     Care Transition Nurse/ Ambulatory Care Manager contacted the patient by telephone to perform post discharge assessment. Verified name and  with patient as identifiers.     -Pt reports that her sore throat, bilateral ear pain & fullness and sinus congestion are gone. She has been taking the Claritin with relief. No fever, cough or SOB. -Pt reports that she finished the Omnicef on 2020. She said she is symptom free. -Pt reports that her PCP office hours have changed to 08-2 pm due to Covid pandemic. Pt works Monday through Friday 08-4:30pm.  Pt reports that she called her PCP office and updated them on her ED visit.    -Offered MyChart, Pt accepted. ACM submitted Pt email via [employer] and gave Pt the Email phone number. Pt said she received the Email. She said she would enroll. -ACM offered [employer] LOOP program, Pt accepted. ACM will submit information to the [facility] program pool. Email: [email or web address]  Ph # [phone]  -Reviewed Adv Care Planning. Patient has following risk factors of: no known risk factors. CTN/ACM reviewed discharge instructions, medical action plan and red flags related to discharge diagnosis. Reviewed and educated them on any new and changed medications related to discharge diagnosis. Advised obtaining a 90-day supply of all daily and as-needed medications. Education provided regarding infection prevention, and signs and symptoms of COVID-19 and when to seek medical attention with patient who verbalized understanding. Discussed exposure protocols and quarantine from [address] you at higher risk for severe illness  and given an opportunity for questions and concerns.  The patient agrees to contact the COVID-19 hotline [phone] or PCP office for questions related to their healthcare. CTN/ACM provided contact information for future reference. From CDC: Are you at higher risk for severe illness?  Wash your hands often.  Avoid close contact (6 feet, which is about two arm lengths) with people who are sick.  Put distance between yourself and other people if COVID-19 is spreading in your community.  Clean and disinfect frequently touched surfaces.  Avoid all cruise travel and non-essential air travel.  Call your healthcare professional if you have concerns about COVID-19 and your underlying condition or if you are sick. For more information on steps you can take to protect yourself, see CDC's How to Protect Yourself    Pt will be further monitored by COVID Loop Team based on severity of symptoms and risk factors.

## 2020-08-01 ENCOUNTER — HOSPITAL ENCOUNTER (EMERGENCY)
Age: 30
Discharge: HOME | End: 2020-08-01
Attending: FAMILY MEDICINE
Payer: COMMERCIAL

## 2020-08-01 VITALS
SYSTOLIC BLOOD PRESSURE: 120 MMHG | RESPIRATION RATE: 16 BRPM | HEIGHT: 61 IN | DIASTOLIC BLOOD PRESSURE: 82 MMHG | TEMPERATURE: 98 F | HEART RATE: 100 BPM | OXYGEN SATURATION: 96 % | WEIGHT: 135 LBS | BODY MASS INDEX: 25.49 KG/M2

## 2020-08-01 PROCEDURE — 99282 EMERGENCY DEPT VISIT SF MDM: CPT

## 2020-08-01 RX ORDER — CEFDINIR 300 MG/1
300 CAPSULE ORAL 2 TIMES DAILY
Qty: 14 CAPSULE | Refills: 0 | Status: SHIPPED | OUTPATIENT
Start: 2020-08-01 | End: 2020-08-08

## 2020-08-01 RX ORDER — FLUTICASONE PROPIONATE 50 MCG
1 SPRAY, SUSPENSION (ML) NASAL DAILY
Qty: 1 BOTTLE | Refills: 0 | Status: SHIPPED | OUTPATIENT
Start: 2020-08-01 | End: 2020-11-16

## 2020-08-01 RX ORDER — CETIRIZINE HYDROCHLORIDE 10 MG/1
10 TABLET ORAL DAILY
Qty: 30 TABLET | Refills: 0 | Status: SHIPPED | OUTPATIENT
Start: 2020-08-01 | End: 2020-08-31

## 2020-08-01 ASSESSMENT — PAIN SCALES - GENERAL: PAINLEVEL_OUTOF10: 3

## 2020-08-01 ASSESSMENT — PAIN DESCRIPTION - ONSET: ONSET: SUDDEN

## 2020-08-01 ASSESSMENT — PAIN DESCRIPTION - LOCATION: LOCATION: FACE;EAR;THROAT

## 2020-08-01 ASSESSMENT — PAIN DESCRIPTION - FREQUENCY: FREQUENCY: CONTINUOUS

## 2020-08-01 ASSESSMENT — PAIN DESCRIPTION - PAIN TYPE: TYPE: ACUTE PAIN

## 2020-08-01 ASSESSMENT — PAIN DESCRIPTION - PROGRESSION: CLINICAL_PROGRESSION: NOT CHANGED

## 2020-08-01 ASSESSMENT — PAIN DESCRIPTION - DESCRIPTORS: DESCRIPTORS: PRESSURE;SORE

## 2020-08-01 NOTE — ED PROVIDER NOTES
purulent rhinorrhea. · Sinuses: mild Bilateral maxillary sinus tenderness. no Bilateral frontal sinus tenderness. · Mouth:  normal tongue and buccal mucosa. · Throat: mild erythema. Airway Patent. · Neck:  Supple. There is no  anterior cervical and posterior cervical node tenderness. · Respiratory:   Breath sounds: Bilateral normal.  Lung sounds: normal.   · CV:  Regular rate and rhythm, normal heart sounds, without pathological murmurs, ectopy, gallops, or rubs. · GI:  Abdomen Soft, nontender, good bowel sounds. No firm or pulsatile mass. · Integument:  Normal turgor. Warm, dry, without visible rash. · Neurological:  Oriented. Motor functions intact. Lab / Imaging Results   (All laboratory and radiology results have been personally reviewed by myself)  Labs:  No results found for this visit on 08/01/20. Imaging: All Radiology results interpreted by Radiologist unless otherwise noted. No orders to display       ED Course / Medical Decision Making   Medications - No data to display       Consults:   None    Procedures:   none    Medical Decision Making:    Based on low suspicion for pneumonia as per history/physical findings, imaging was not done. Antibiotics are indicated at this time based on clinical presentation and physical findings. She is not hypoxic. Patient is well appearing, non toxic and appropriate for outpatient management. Plan of Care: Normal progression of disease discussed. All questions answered. Explained the rationale for symptomatic treatment rather than use of an antibiotic. Instruction provided in the use of fluids, vaporizer, acetaminophen, and other OTC medication for symptom control. Extra fluids  Analgesics as needed, dose reviewed. Follow up as needed should symptoms fail to improve. Counseling:     The emergency provider has spoken with the patient and discussed todays results, in addition to providing specific details for the plan of care and counseling regarding the diagnosis and prognosis. Questions are answered at this time and they are agreeable with the plan. Assessment     1. Acute sinusitis, recurrence not specified, unspecified location      Plan   Discharge to home  Patient condition is good    New Medications     New Prescriptions    CEFDINIR (OMNICEF) 300 MG CAPSULE    Take 1 capsule by mouth 2 times daily for 7 days    CETIRIZINE (ZYRTEC) 10 MG TABLET    Take 1 tablet by mouth daily    FLUTICASONE (FLONASE) 50 MCG/ACT NASAL SPRAY    1 spray by Each Nostril route daily     Electronically signed by Rashad Pinzon MD   DD: 8/1/20  **This report was transcribed using voice recognition software. Every effort was made to ensure accuracy; however, inadvertent computerized transcription errors may be present.   END OF ED PROVIDER NOTE          Rashad Pinzon MD  08/01/20 1182

## 2020-11-16 ENCOUNTER — HOSPITAL ENCOUNTER (EMERGENCY)
Age: 30
Discharge: HOME | End: 2020-11-16
Payer: COMMERCIAL

## 2020-11-16 VITALS
TEMPERATURE: 97.5 F | BODY MASS INDEX: 24.55 KG/M2 | HEART RATE: 77 BPM | HEIGHT: 61 IN | OXYGEN SATURATION: 99 % | DIASTOLIC BLOOD PRESSURE: 81 MMHG | SYSTOLIC BLOOD PRESSURE: 121 MMHG | WEIGHT: 130 LBS | RESPIRATION RATE: 16 BRPM

## 2020-11-16 LAB — STREP GRP A PCR: NEGATIVE

## 2020-11-16 PROCEDURE — U0003 INFECTIOUS AGENT DETECTION BY NUCLEIC ACID (DNA OR RNA); SEVERE ACUTE RESPIRATORY SYNDROME CORONAVIRUS 2 (SARS-COV-2) (CORONAVIRUS DISEASE [COVID-19]), AMPLIFIED PROBE TECHNIQUE, MAKING USE OF HIGH THROUGHPUT TECHNOLOGIES AS DESCRIBED BY CMS-2020-01-R: HCPCS

## 2020-11-16 PROCEDURE — 6370000000 HC RX 637 (ALT 250 FOR IP): Performed by: NURSE PRACTITIONER

## 2020-11-16 PROCEDURE — 87880 STREP A ASSAY W/OPTIC: CPT

## 2020-11-16 PROCEDURE — 99283 EMERGENCY DEPT VISIT LOW MDM: CPT

## 2020-11-16 RX ORDER — ACETAMINOPHEN 500 MG
500 TABLET ORAL EVERY 6 HOURS PRN
Qty: 20 TABLET | Refills: 0 | Status: SHIPPED | OUTPATIENT
Start: 2020-11-16 | End: 2020-11-29

## 2020-11-16 RX ORDER — ACETAMINOPHEN 500 MG
1000 TABLET ORAL ONCE
Status: COMPLETED | OUTPATIENT
Start: 2020-11-16 | End: 2020-11-16

## 2020-11-16 RX ORDER — CETIRIZINE HYDROCHLORIDE 10 MG/1
10 TABLET ORAL DAILY
Qty: 14 TABLET | Refills: 0 | Status: SHIPPED | OUTPATIENT
Start: 2020-11-16 | End: 2020-11-29

## 2020-11-16 RX ORDER — LORATADINE AND PSEUDOEPHEDRINE SULFATE 5; 120 MG/1; MG/1
1 TABLET, EXTENDED RELEASE ORAL 2 TIMES DAILY
Qty: 14 TABLET | Refills: 0 | Status: SHIPPED | OUTPATIENT
Start: 2020-11-16 | End: 2020-11-16 | Stop reason: CLARIF

## 2020-11-16 RX ORDER — AMOXICILLIN AND CLAVULANATE POTASSIUM 875; 125 MG/1; MG/1
1 TABLET, FILM COATED ORAL 2 TIMES DAILY
Qty: 20 TABLET | Refills: 0 | Status: SHIPPED | OUTPATIENT
Start: 2020-11-16 | End: 2020-11-26

## 2020-11-16 RX ADMIN — ACETAMINOPHEN 1000 MG: 500 TABLET ORAL at 18:53

## 2020-11-16 ASSESSMENT — PAIN DESCRIPTION - LOCATION: LOCATION: HEAD

## 2020-11-16 ASSESSMENT — PAIN SCALES - GENERAL
PAINLEVEL_OUTOF10: 2
PAINLEVEL_OUTOF10: 2

## 2020-11-16 ASSESSMENT — PAIN DESCRIPTION - PAIN TYPE: TYPE: ACUTE PAIN

## 2020-11-16 ASSESSMENT — PAIN DESCRIPTION - DESCRIPTORS: DESCRIPTORS: HEADACHE

## 2020-11-16 NOTE — ED PROVIDER NOTES
[facility]         Department of Emergency Medicine   ED  Provider Note  Admit Date/RoomTime: 11/16/2020  5:51 PM  ED Room: 02/02  Chief Complaint:   Sinusitis (pt presents with L sided facial pain and ear pain that started today); Headache; and Nasal Congestion    History of Present Illness   Source of history provided by:  patient. History/Exam Limitations: none. Jayden Rebolledo is a 31 y.o. old female who presents to the emergency department by private vehicle, for \"scratchy\" throat, left-sided facial pain, left ear pain, runny nose, nasal congestion and a productive cough for white phlegm that started today prior to arrival.  She denies any chest pain, shortness of breath, fevers, chills, dental pain, lymph node enlargement, rashes, wheezing, hemoptysis, rashes or neck stiffness. Patient thought she had a caffeine headache and tried drinking coffee and the headache did not go away. She denies any injury to the area or any anticoagulation. She states she works at a  and she denies any COVID-19 exposures. Patient has no history of tobacco use and states she usually gets allergies once a year and will take Zyrtec. She denies any blurred vision, double vision, photophobia, nausea, vomiting. Denies this being the first or worst headache that she has ever had. Since onset the symptoms have been persistent and mild-moderate in severity. The symptoms are associated with nothing of significance. There has been NO abdominal pain, decreased appetite, conjunctivitis, watery eyes, dry cough, productive cough, nausea, vomiting, diarrhea, dizziness, dysuria, urinary frequency, fatigue, hoarseness, irritability, joint swelling, muscle aches, swollen glands, wheezing, loss of taste or loss of smell. ROS   Pertinent positives and negatives are stated within HPI, all other systems reviewed and are negative.    has a past medical history of Fibromyalgia, Seasonal allergies, and Urinary tract infection. Past Surgical History:  has no past surgical history on file. Social History:  reports that she has never smoked. She has never used smokeless tobacco. She reports current alcohol use. She reports that she does not use drugs. Family History: family history is not on file. Allergies: Sulfa antibiotics    Physical Exam           ED Triage Vitals   BP Temp Temp Source Pulse Resp SpO2 Height Weight   11/16/20 1754 11/16/20 1754 11/16/20 1754 11/16/20 1754 11/16/20 1754 11/16/20 1754 11/16/20 1755 11/16/20 1755   121/81 97.5 °F (36.4 °C) Infrared 77 16 99 % 5' 1\" (1.549 m) 130 lb (59 kg)      Oxygen Saturation Interpretation: Normal.    · Constitutional:  Alert, development consistent with age. · Ears:  External Ears: Bilateral normal.               TM's & External Canals: normal appearance. · Nose:   There is mucosal erythema and mucosal edema. · Sinuses: no Bilateral maxillary sinus tenderness. no Bilateral frontal sinus tenderness. · Mouth:  normal tongue and buccal mucosa. · Throat: no erythema or exudates noted. Teeth and gums normal., mild erythema, no tonsillar hypertrophy, throat culture taken and mucous membranes moist.  Airway Patent. · Neck:  Supple. There is no  preauricular, submental, parotid, anterior cervical, posterior cervical, supraclavicular, epitrochlear and axillary node tenderness. · Respiratory:   Breath sounds: Bilateral normal.  Lung sounds: normal.   · CV:  Regular rate and rhythm, normal heart sounds, without pathological murmurs, ectopy, gallops, or rubs. · GI:  Abdomen Soft, nontender, good bowel sounds. No firm or pulsatile mass. · Integument:  Normal turgor. Warm, dry, without visible rash. · Neurological:  Oriented. Motor functions intact.        Lab / Imaging Results   (All laboratory and radiology results have been personally reviewed by myself)  Labs:  Results for orders placed or performed during the hospital encounter of 11/16/20 Strep Screen Group A Throat    Specimen: Throat   Result Value Ref Range    Strep Grp A PCR Negative Negative   Covid-19 Ambulatory   Result Value Ref Range    Source NP swab        Imaging: All Radiology results interpreted by Radiologist unless otherwise noted. No orders to display       ED Course / Medical Decision Making     Medications   acetaminophen (TYLENOL) tablet 1,000 mg (1,000 mg Oral Given 11/16/20 1853)        Re-examination:  11/16/20       Time: 1940    Patients symptoms are improving. Consults:   None    Procedures:   none    Medical Decision Making:    Based on low suspicion for pneumonia as per history/physical findings, imaging was not done. Strep test was negative   Based on moderate suspicion for COVID-19 as per history/physical findings, testing was done and results are pending.  Upper respiratory infection will treat with Augmentin, decongestant and Tylenol for suspected sinusitis at this time based on clinical presentation and physical findings. She is not hypoxic. Patient is well appearing, non toxic and appropriate for outpatient management. She is advised on COVID-19 isolation for the next 10 days and given work excuse. Plan of Care/ Counseling:    I discussed today's results with the patient in addition to providing specific details for the plan of care and counseling regarding the diagnosis and prognosis as well as reasons to return to emergency department. Questions are answered at this time and they are agreeable with the plan. Assessment     1. Acute upper respiratory infection    2. Sinus congestion    3. Otalgia of left ear    4.  Educated about COVID-19 virus infection      Plan   Discharge to home  Patient condition is good    New Medications     Discharge Medication List as of 11/16/2020  7:54 PM      START taking these medications    Details   amoxicillin-clavulanate (AUGMENTIN) 875-125 MG per tablet Take 1 tablet by mouth 2 times daily for 10 days, Disp-20 tablet,R-0Print      acetaminophen (APAP EXTRA STRENGTH) 500 MG tablet Take 1 tablet by mouth every 6 hours as needed for Pain, Disp-20 tablet,R-0Print      cetirizine (ZYRTEC) 10 MG tablet Take 1 tablet by mouth daily for 14 days, Disp-14 tablet,R-0Print           Electronically signed by HAJA Rasheed CNP   DD: 11/16/20  **This report was transcribed using voice recognition software. Every effort was made to ensure accuracy; however, inadvertent computerized transcription errors may be present.   END OF ED PROVIDER NOTE       HAJA Rasheed CNP  11/18/20 9907

## 2020-11-17 ENCOUNTER — CARE COORDINATION (OUTPATIENT)
Dept: CARE COORDINATION | Age: 30
End: 2020-11-17

## 2020-11-17 NOTE — CARE COORDINATION
Patient contacted regarding COVID-19 exposure. Discussed COVID-19 related testing which was pending at this time. Test results were pending. Patient informed of results, if available? PENDING    Care Transition Nurse/ Ambulatory Care Manager contacted the patient by telephone to perform post discharge assessment. Call within 2 business days of discharge: Yes. Verified name and  with patient as identifiers. Provided introduction to self, and explanation of the CTN/ACM role, and reason for call due to risk factors for infection and/or exposure to COVID-19. Symptoms reviewed with patient who verbalized the following symptoms: fatigue, no new symptoms and no worsening symptoms. Due to no new or worsening symptoms encounter was not routed to provider for escalation. Discussed follow-up appointments. If no appointment was previously scheduled, appointment scheduling offered: Yes  [facility] follow up appointment(s): No future appointments. Non-[facility] follow up appointment(s): Vikki declined assistance scheduling with NANCY Mak and states she will call after her COVID test results. Non-face-to-face services provided:  Obtained and reviewed discharge summary and/or continuity of care documents     Advance Care Planning:   Does patient have an Advance Directive:  decision maker updated. Patient has following risk factors of: no known risk factors. CTN/ACM reviewed discharge instructions, medical action plan and red flags such as increased shortness of breath, increasing fever and signs of decompensation with patient who verbalized understanding. Discussed exposure protocols and quarantine with CDC Guidelines What to do if you are sick with coronavirus disease 2019.  Patient was given an opportunity for questions and concerns.  The patient agrees to contact the [facility] exposure line [phone], [facility] [place] [facility]: ([phone]) and PCP office for questions related to their healthcare. CTN/ACM provided contact information for future needs. Reviewed and educated patient on any new and changed medications related to discharge diagnosis     Vikki denies any fever, cough or shortness of breath. She reports she is taking her augmentin and zyrtec as prescribed. She is active with MyChart. Patient/family/caregiver given information for [facility] and agrees to enroll yes  Patient's preferred e-mail: [email or web address]   Patient's preferred phone number: [phone]  Based on Loop alert triggers, patient will be contacted by nurse care manager for worsening symptoms. Pt will be further monitored by COVID Loop Team based on severity of symptoms and risk factors.

## 2020-11-17 NOTE — ED NOTES
Multiple rx given along with work note-  Advised to utilize [employer] for covid results     Jayjay Ball RN  11/16/20 1953

## 2020-11-19 LAB
SARS-COV-2: NOT DETECTED
SOURCE: NORMAL

## 2020-11-29 ENCOUNTER — HOSPITAL ENCOUNTER (EMERGENCY)
Age: 30
Discharge: HOME | End: 2020-11-29
Attending: EMERGENCY MEDICINE
Payer: COMMERCIAL

## 2020-11-29 VITALS
OXYGEN SATURATION: 98 % | HEIGHT: 60 IN | BODY MASS INDEX: 25.52 KG/M2 | WEIGHT: 130 LBS | TEMPERATURE: 97.2 F | SYSTOLIC BLOOD PRESSURE: 124 MMHG | RESPIRATION RATE: 18 BRPM | DIASTOLIC BLOOD PRESSURE: 88 MMHG | HEART RATE: 89 BPM

## 2020-11-29 PROCEDURE — 99282 EMERGENCY DEPT VISIT SF MDM: CPT

## 2020-11-29 PROCEDURE — U0003 INFECTIOUS AGENT DETECTION BY NUCLEIC ACID (DNA OR RNA); SEVERE ACUTE RESPIRATORY SYNDROME CORONAVIRUS 2 (SARS-COV-2) (CORONAVIRUS DISEASE [COVID-19]), AMPLIFIED PROBE TECHNIQUE, MAKING USE OF HIGH THROUGHPUT TECHNOLOGIES AS DESCRIBED BY CMS-2020-01-R: HCPCS

## 2020-11-29 NOTE — LETTER
[facility] Emergency Department  [id number] [facility] [address]  Phone: [phone]             December 2, 2020    Patient: Jeffrey Franklin   YOB: 1990   Date of Visit: 11/29/2020       To Whom It May Concern:    Jeffrey Franklin was seen and treated in our emergency department on 11/29/2020. She may return to work on 12/08/2020.       Sincerely,             Signature:__________________________________

## 2020-11-30 ENCOUNTER — CARE COORDINATION (OUTPATIENT)
Dept: CARE COORDINATION | Age: 30
End: 2020-11-30

## 2020-11-30 NOTE — ED PROVIDER NOTES
range of motion, no nuchal rigidity. TMs clear and intact bilaterally. Respiratory:  No respiratory distress, normal breath sounds, no rales, no wheezing   Cardiovascular:  Normal rate, normal rhythm, no murmurs, no gallops, no rubs. Radial pulses 2+ bilaterally. GI:  Soft, nondistended, normal bowel sounds, nontender, no rebound, no guarding   :  No costovertebral angle tenderness   Musculoskeletal:  No edema, no tenderness, no deformities. Back- no tenderness  Integument:  Well hydrated, no visible rash. Adequate perfusion. Lymphatic:  No cervical lymphadenopathy noted   Neurologic:  Alert & oriented x 3, CN 2-12 normal, no focal deficits noted. Normal gait. Psychiatric:  Speech and behavior appropriate       -------------------------------------------------- RESULTS -------------------------------------------------  I have personally reviewed all laboratory and imaging results for this patient. Results are listed below. LABS:  No results found for this visit on 11/29/20. RADIOLOGY:  Interpreted by Radiologist.  No orders to display       ------------------------- NURSING NOTES AND VITALS REVIEWED ---------------------------  The nursing notes within the ED encounter and vital signs as below have been reviewed by myself. /88   Pulse 89   Temp 97.2 °F (36.2 °C) (Infrared)   Resp 18   Ht 5' (1.524 m)   Wt 130 lb (59 kg)   SpO2 98%   BMI 25.39 kg/m²   Oxygen Saturation Interpretation: Normal      The patients available past medical records and past encounters were reviewed. ------------------------------ ED COURSE/MEDICAL DECISION MAKING----------------------  Medications - No data to display        Procedures:   none      Medical Decision Making:    Patient appears well, nontoxic, afebrile with no hypoxia. She was provided a Covid test, she was advised that her results will come in in 3 to 5 days and be available on my chart.   She was advised to self quarantine and isolate until she is asymptomatic and her tests are resulted negative. She understands and agrees to plan. She will return for worsening or new concerning symptoms. Patient was explicitly instructed on specific signs and symptoms on which to return to the emergency room for. Patient was instructed to return to the ER for any new or worsening symptoms. Additional discharge instructions were given verbally. All questions were answered. Patient is comfortable and agreeable with discharge plan. Patient in no acute distress and non-toxic in appearance. This patient's ED course included: a personal history and physicial eaxmination    This patient has remained hemodynamically stable during their ED course. Consultations:   none    Critical Care: none    I, Carla Child, DO, am the Primary Provider of Record    Counseling: The emergency provider has spoken with the patient and discussed todays results, in addition to providing specific details for the plan of care and counseling regarding the diagnosis and prognosis. Questions are answered at this time and they are agreeable with the plan.    --------------------------- IMPRESSION AND DISPOSITION ---------------------------------    IMPRESSION  1.  Encounter for laboratory testing for COVID-19 virus        DISPOSITION  Disposition: Discharge to home  Patient condition is stable             Ebony Hebert DO  11/29/20 1039

## 2020-12-01 ENCOUNTER — CARE COORDINATION (OUTPATIENT)
Dept: CARE COORDINATION | Age: 30
End: 2020-12-01

## 2020-12-01 NOTE — CARE COORDINATION
Patient contacted regarding COVID-19 risk and screening. Discussed COVID-19 related testing which was pending at this time. Test results were pending. Patient informed of results, if available? Patient is aware that her COVID test is pending. Care Transition Nurse/ Ambulatory Care Manager contacted the patient by telephone to perform follow-up assessment. Verified name and  with patient as identifiers. Patient has following risk factors of: no known risk factors. Symptoms reviewed with patient who verbalized the following symptoms: patient had no symptoms, she was exposed and wanted tested. .      Due to no new or worsening symptoms encounter was not routed to provider for escalation. Healthcare decision makers were reviewed and information is current. Education provided regarding infection prevention, and signs and symptoms of COVID-19 and when to seek medical attention with patient who verbalized understanding. Discussed exposure protocols and quarantine from [address] you at higher risk for severe illness  and given an opportunity for questions and concerns. The patient agrees to contact the COVID-19 hotline [phone] or PCP office for questions related to their healthcare. CTN/ACM provided contact information for future reference. Patient states she will call her PCP after receiving her COVID test results. Patient is active on My Chart and is aware how to utilize this tool. From CDC: Are you at higher risk for severe illness?  Wash your hands often.  Avoid close contact (6 feet, which is about two arm lengths) with people who are sick.  Put distance between yourself and other people if COVID-19 is spreading in your community.  Clean and disinfect frequently touched surfaces.  Avoid all cruise travel and non-essential air travel.    Call your healthcare professional if you have concerns about COVID-19 and your underlying condition or if you are sick.    For more information on steps you can take to protect yourself, see CDC's How to Protect Yourself    Patient's LOOP was extended d/t this ED Visit. Pt will be further monitored by COVID Loop Team based on severity of symptoms and risk factors.

## 2020-12-02 LAB
SARS-COV-2: NOT DETECTED
SOURCE: NORMAL

## 2020-12-11 ENCOUNTER — OFFICE VISIT (OUTPATIENT)
Dept: PRIMARY CARE CLINIC | Age: 30
End: 2020-12-11
Payer: COMMERCIAL

## 2020-12-11 VITALS
BODY MASS INDEX: 24.55 KG/M2 | WEIGHT: 130 LBS | HEART RATE: 82 BPM | SYSTOLIC BLOOD PRESSURE: 121 MMHG | HEIGHT: 61 IN | TEMPERATURE: 98.5 F | DIASTOLIC BLOOD PRESSURE: 84 MMHG | OXYGEN SATURATION: 97 %

## 2020-12-11 DIAGNOSIS — Z20.822 EXPOSURE TO COVID-19 VIRUS: ICD-10-CM

## 2020-12-11 PROCEDURE — G8427 DOCREV CUR MEDS BY ELIG CLIN: HCPCS | Performed by: PHYSICIAN ASSISTANT

## 2020-12-11 PROCEDURE — G8484 FLU IMMUNIZE NO ADMIN: HCPCS | Performed by: PHYSICIAN ASSISTANT

## 2020-12-11 PROCEDURE — G8420 CALC BMI NORM PARAMETERS: HCPCS | Performed by: PHYSICIAN ASSISTANT

## 2020-12-11 PROCEDURE — 1036F TOBACCO NON-USER: CPT | Performed by: PHYSICIAN ASSISTANT

## 2020-12-11 PROCEDURE — 99213 OFFICE O/P EST LOW 20 MIN: CPT | Performed by: PHYSICIAN ASSISTANT

## 2020-12-11 NOTE — PROGRESS NOTES
Chief Complaint   Other (no symptoms, son positive.)      History of Present Illness   Source of history provided by: patient. Leslie Quinn is a 31 y.o. old female who has a past medical history of:   Past Medical History:   Diagnosis Date    Fibromyalgia     Seasonal allergies     Urinary tract infection    Presents to the flu clinic for COVID-19 testing. Patient states her son just tested positive yesterday. She denies any symptoms currently. Denies any for, chills, loss of taste or smell, CP, dyspnea, LE edema, abdominal pain, vomiting, rash, or lethargy. Denies any hx of asthma or tobacco use. ROS   Pertinent positives and negatives are stated within HPI, all other systems reviewed and are negative. Surgical History:  has no past surgical history on file. Social History:  reports that she has never smoked. She has never used smokeless tobacco. She reports current alcohol use. She reports that she does not use drugs. Family History: family history is not on file. Allergies: Sulfa antibiotics    Physical Exam      VS:  /84   Pulse 82   Temp 98.5 °F (36.9 °C)   Ht 5' 1\" (1.549 m)   Wt 130 lb (59 kg)   SpO2 97%   BMI 24.56 kg/m²    Oxygen Saturation Interpretation: Normal.    Constitutional:  Alert, development consistent with age. NAD. Head:  NC/NT. Airway patent. Mouth: Posterior pharynx with mild erythema and clear postnasal drip. No tonsillar hypertrophy or exudate. Neck:  Normal ROM. Supple. No anterior cervical adenopathy noted. Lungs: CTAB without wheezes, rales, or rhonchi. CV:  Regular rate and rhythm, normal heart sounds, without pathological murmurs, ectopy, gallops, or rubs. Skin:  Normal turgor. Warm, dry, without visible rash. Lymphatic: No lymphangitis or adenopathy noted. Neurological:  Oriented. Motor functions intact.     Lab / Imaging Results   (All laboratory and radiology results have been personally reviewed by myself)  Labs:  No results found for this visit on 12/11/20. Imaging: All Radiology results interpreted by Radiologist unless otherwise noted. No results found. Medical Decision Making   Pt non-toxic, in no apparent distress and stable at time of discharge. Assessment/Plan   Vikki was seen today for other. Diagnoses and all orders for this visit:    Exposure to COVID-19 virus  -     COVID-19 Ambulatory; Future    Encounter for laboratory testing for COVID-19 virus        COVID-19 swab obtained and pending, will call with results once available. Advised cautionary self-quarantine at home in the interim. Pt should remain out of work for at least 7 days from last exposure assuming her test is negative. If patient test positive today, we will advise a 10-day quarantine from date of positive test. ED immediately with high or refractory fever, progressive SOB, dyspnea, CP, calf pain/swelling, shaking chills, vomiting, abdominal pain, lethargy, flank pain, or decreased urinary output. Pt verbalizes understanding and is in agreement with plan of care. All questions answered. Leela Padilla PA-C    This visit was provided as a focused evaluation during the COVID -19 pandemic/national emergency. A comprehensive review of all previous patient history and testing was not conducted. Pertinent findings were elicited during the visit.

## 2020-12-12 LAB
SARS-COV-2: NOT DETECTED
SOURCE: NORMAL

## 2021-10-08 NOTE — ED PROVIDER NOTES
Problem: Falls - Risk of:  Goal: Absence of physical injury  Description: Absence of physical injury  10/8/2021 0341 by Lillie Alcaraz RN  Outcome: Ongoing  Note: No physical injury present     Problem: Discharge Planning:  Goal: Discharged to appropriate level of care  Description: Discharged to appropriate level of care  10/8/2021 0341 by Lillie Alcaraz RN  Outcome: Ongoing  Note: Remains in hospital, discussed possible discharge needs. Problem: Discharge Planning:  Goal: Discharged to appropriate level of care  Description: Discharged to appropriate level of care  Outcome: Ongoing  Note: Remains in hospital, discussed possible discharge needs. Problem: Fluid Volume - Imbalance:  Goal: Absence of postpartum hemorrhage signs and symptoms  Description: Absence of postpartum hemorrhage signs and symptoms  Outcome: Ongoing  Note: Vaginal bleeding WNL, Fundus firm and midline, no clots or foul odors. Problem: Fluid Volume - Imbalance:  Goal: Absence of imbalanced fluid volume signs and symptoms  Description: Absence of imbalanced fluid volume signs and symptoms  Outcome: Ongoing  Note: S/P fluid bolus. Urine output adequate     Problem: Infection - Surgical Site:  Goal: Will show no infection signs and symptoms  Description: Will show no infection signs and symptoms  Outcome: Ongoing  Note: Vital signs and assessments WNL. Problem: Mood - Altered:  Goal: Mood stable  Description: Mood stable  Outcome: Ongoing  Note: Bonding with baby, participating in infant care. Problem: Nausea/Vomiting:  Goal: Absence of nausea/vomiting  Description: Absence of nausea/vomiting  Outcome: Ongoing  Note: Nausea improved since phenergan shot     Problem: Pain - Acute:  Goal: Pain level will decrease  Description: Pain level will decrease  Outcome: Ongoing  Note: Pain controlled with IV toradol. Discussed ice for perineal pain and/or incisional pain or the use of warm blanket/heating pad for uterine cramps.  Pt NAD  Head: Normocephalic and atraumatic  Mouth: Oropharynx clear, handling secretions, no trismus  Neck: Supple, full ROM,  Pulmonary: Lungs clear to auscultation bilaterally, no wheezes, rales, or rhonchi. Not in respiratory distress  Cardiovascular:  Regular rate. Regular rhythm. No murmurs  Chest: no chest wall tenderness  Abdomen: Soft. Mild right-sided CVA tenderness, and tenderness to palpation over the right flank no rebound, rigidity or guarding. Musculoskeletal: Moves all extremities x 4. Warm and well perfused, no clubbing, cyanosis, or edema. Capillary refill <3 seconds  Skin: warm and dry. No rashes. Neurologic: GCS 15, no gross focal neurologic deficits  Psych: Normal Affect    -------------------------------------------------- RESULTS -------------------------------------------------  I have personally reviewed all laboratory and imaging results for this patient. Results are listed below.      LABS:  Results for orders placed or performed during the hospital encounter of 06/24/20   URINALYSIS   Result Value Ref Range    Color, UA Yellow Straw/Yellow    Clarity, UA Clear Clear    Glucose, Ur Negative Negative mg/dL    Bilirubin Urine Negative Negative    Ketones, Urine Negative Negative mg/dL    Specific Gravity, UA 1.010 1.005 - 1.030    Blood, Urine LARGE (A) Negative    pH, UA 6.0 5.0 - 9.0    Protein, UA TRACE Negative mg/dL    Urobilinogen, Urine 0.2 <2.0 E.U./dL    Nitrite, Urine Negative Negative    Leukocyte Esterase, Urine LARGE (A) Negative   Pregnancy, urine   Result Value Ref Range    HCG(Urine) Pregnancy Test NEGATIVE NEGATIVE   Microscopic Urinalysis   Result Value Ref Range    WBC, UA 5-10 (A) 0 - 5 /HPF    RBC, UA 5-10 (A) 0 - 2 /HPF    Bacteria, UA NONE SEEN None Seen /HPF   CBC Auto Differential   Result Value Ref Range    WBC 9.0 4.5 - 11.5 E9/L    RBC 4.56 3.50 - 5.50 E12/L    Hemoglobin 13.7 11.5 - 15.5 g/dL    Hematocrit 41.3 34.0 - 48.0 %    MCV 90.6 80.0 - 99.9 fL    MCH 30.0 states her pain goal 4/10 has been met. Problem: Urinary Retention:  Goal: Urinary elimination within specified parameters  Description: Urinary elimination within specified parameters  Outcome: Ongoing  Note: Staples in place, output adequate     Problem: Venous Thromboembolism:  Goal: Absence of signs or symptoms of impaired coagulation  Description: Absence of signs or symptoms of impaired coagulation  Outcome: Ongoing  Note: Homans sign negative      Problem: Pain:  Goal: Pain level will decrease  Description: Pain level will decrease  Outcome: Ongoing  Note: Pain controlled with IV toradol. Discussed ice for perineal pain and/or incisional pain or the use of warm blanket/heating pad for uterine cramps. Pt states her pain goal 4/10 has been met. Problem: Pain:  Goal: Control of acute pain  Description: Control of acute pain  Outcome: Ongoing  Note: Pain controlled with po meds. Discussed ice for perineal pain and/or incisional pain or the use of warm blanket/heating pad for uterine cramps. Pt states her pain goal 4/10 has been met. Problem: Pain:  Goal: Control of chronic pain  Description: Control of chronic pain  Outcome: Completed  Note: No chronic pain present     Care plan reviewed with patient and she contributes to goal setting and voices understanding of plan of care. myself. /79   Pulse 79   Temp 98.3 °F (36.8 °C) (Infrared)   Resp 18   Ht 5' 1\" (1.549 m)   Wt 138 lb (62.6 kg)   LMP 06/11/2020   SpO2 99%   BMI 26.07 kg/m²   Oxygen Saturation Interpretation: Normal    The patients available past medical records and past encounters were reviewed. ------------------------------ ED COURSE/MEDICAL DECISION MAKING----------------------  Medications   cefTRIAXone (ROCEPHIN) 1 g in sterile water 10 mL IV syringe (0 g Intravenous Stopped 6/24/20 1447)   0.9 % sodium chloride bolus (0 mLs Intravenous Stopped 6/24/20 1549)   ketorolac (TORADOL) injection 30 mg (30 mg Intravenous Given 6/24/20 1439)   iopamidol (ISOVUE-370) 76 % injection 100 mL (100 mLs Intravenous Given 6/24/20 1557)             Medical Decision Making:   Patient is a 28-year-old female presenting emergency department the chief complaint flank pain. Patient labs and imaging reviewed. No systemic signs of sepsis. Patient found to have pyelonephritis. Patient given Rocephin. Patient will be discharged home on [address] [facility] Follow-up outpatient. Re-Evaluations/Consultations:                    Critical Care: Please note that the withdrawal or failure to initiate urgent interventions for this patient would likely result in a life threatening deterioration or permanent disability. Accordingly this patient received 0 minutes of critical care time, excluding separately billable procedures. This patient's ED course included: History, physical examination, reevaluation prior to disposition, labs, imaging, IV fluids, IV Rocephin      This patient has remained hemodynamically stable during their ED course. Counseling: The emergency provider has spoken with the patient and discussed todays results, in addition to providing specific details for the plan of care and counseling regarding the diagnosis and prognosis.   Questions are answered at this time and they are agreeable with the

## 2021-12-28 ENCOUNTER — HOSPITAL ENCOUNTER (EMERGENCY)
Age: 31
Discharge: LEFT BEFORE DISCHARGE | End: 2021-12-28